# Patient Record
Sex: MALE | Race: OTHER | HISPANIC OR LATINO | URBAN - METROPOLITAN AREA
[De-identification: names, ages, dates, MRNs, and addresses within clinical notes are randomized per-mention and may not be internally consistent; named-entity substitution may affect disease eponyms.]

---

## 2020-01-16 ENCOUNTER — EMERGENCY (EMERGENCY)
Facility: HOSPITAL | Age: 25
LOS: 0 days | Discharge: HOME | End: 2020-01-16
Admitting: EMERGENCY MEDICINE
Payer: SUBSIDIZED

## 2020-01-16 VITALS
HEART RATE: 67 BPM | DIASTOLIC BLOOD PRESSURE: 92 MMHG | RESPIRATION RATE: 18 BRPM | SYSTOLIC BLOOD PRESSURE: 142 MMHG | OXYGEN SATURATION: 99 %

## 2020-01-16 DIAGNOSIS — K08.89 OTHER SPECIFIED DISORDERS OF TEETH AND SUPPORTING STRUCTURES: ICD-10-CM

## 2020-01-16 PROCEDURE — 99282 EMERGENCY DEPT VISIT SF MDM: CPT

## 2020-01-16 NOTE — CONSULT NOTE ADULT - SUBJECTIVE AND OBJECTIVE BOX
Emergency- #14 extraction  S: 25 yo male patient presented to clinic with upper right pain. Patient reports that it has been hurting him for the last two days and it is radiating up to his ear. His only relief is when he drinks cold water.     O: 1 periapical taken of #14- Caries to the pulp seen on #14. Also noticed inflamed gingiva throughout the mouth. Informed the patient that he needs a cleaning soon to reduce the inflammation in the mouth. Patient understands.     A: Moderate swelling seen on mandibular right side. Caries to the pulp on tooth #14. Gave options to the patient: 1. root canal, post, core crown or 2. extraction. Patient elected for extraction.     P: Explained risks and benefits of procedure as per OS sheet dated 07/13/2000. Informed the patient that the root is close to the sinus and there is a chance of sinus communication. Patient understands and would still like to proceed with treatment. Consent obtained and side site completed. Anesthetized using 2 carpules 4% septocaine with 1:100,000 epinephrine and 1 carpule 2% lidocaine with 1:100,000 epinephrine via buccal and palatal infiltration. Surgical extraction of #14 completed. Elevated using elevators and used surgical handpiece to place buccal trough to elevate roots out. Delivered with forceps. Hemostasis acquired with pressure. Curetted area. Smoothed bone using bone file. 3 interrupted sutures placed using 3.0 plain gut suture. Post-op radiograph taken. Post-op instructions given.     Prescribed 600 mg ibuprofen for pain every 6 hours and 500 mg amoxicillin every 8 hours for one week.     Recommendations:   1) Follow-up with primary dentist for care     Marisa Moran DDS, [phone] (spectra number) Emergency- #14 extraction  S: 23 yo male patient presented to clinic with upper left pain. Patient reports that it has been hurting him for the last two days and it is radiating up to his ear. His only relief is when he drinks cold water.     O: 1 periapical taken of #14- Caries to the pulp seen on #14. Also noticed inflamed gingiva throughout the mouth. Informed the patient that he needs a cleaning soon to reduce the inflammation in the mouth. Patient understands.     A: Moderate swelling seen on mandibular right side. Caries to the pulp on tooth #14. Gave options to the patient: 1. root canal, post, core crown or 2. extraction. Patient elected for extraction.     P: Explained risks and benefits of procedure as per OS sheet dated 07/13/2000. Informed the patient that the root is close to the sinus and there is a chance of sinus communication. Patient understands and would still like to proceed with treatment. Consent obtained and side site completed for extraction of #14. Anesthetized using 2 carpules 4% septocaine with 1:100,000 epinephrine and 1 carpule 2% lidocaine with 1:100,000 epinephrine via buccal and palatal infiltration. Surgical extraction of #14 completed. Elevated using elevators and used surgical handpiece to place buccal trough to elevate roots out. Delivered with forceps. Hemostasis acquired with pressure. Curetted area. Smoothed bone using bone file. 3 interrupted sutures placed using 3.0 plain gut suture. Post-op radiograph taken. Post-op instructions given.     Prescribed 600 mg ibuprofen for pain every 6 hours and 500 mg amoxicillin every 8 hours for one week.     Recommendations:   1) Follow-up with primary dentist for care     Marisa Moran DDS, [phone] (spectra number)

## 2020-01-16 NOTE — ED PROVIDER NOTE - OBJECTIVE STATEMENT
23 yo M  c/o left lower tooth pain x 2 days. No fevers. Pain is constant, moderate, worse with eating.

## 2020-01-16 NOTE — ED PROVIDER NOTE - PHYSICAL EXAMINATION
Physical Exam    Vital Signs: I have reviewed the initial vital signs.  Constitutional: well-nourished, appears stated age, no acute distress  Dental: + pain to tooth #19 with swelling to gums  Neuro: AOx3, No focal deficits noted

## 2021-04-29 ENCOUNTER — OUTPATIENT (OUTPATIENT)
Dept: OUTPATIENT SERVICES | Facility: HOSPITAL | Age: 26
LOS: 1 days | Discharge: HOME | End: 2021-04-29

## 2021-05-04 DIAGNOSIS — K02.63 DENTAL CARIES ON SMOOTH SURFACE PENETRATING INTO PULP: ICD-10-CM
